# Patient Record
Sex: FEMALE | Race: WHITE | NOT HISPANIC OR LATINO | URBAN - METROPOLITAN AREA
[De-identification: names, ages, dates, MRNs, and addresses within clinical notes are randomized per-mention and may not be internally consistent; named-entity substitution may affect disease eponyms.]

---

## 2020-08-03 ENCOUNTER — TELEPHONE ENCOUNTER (OUTPATIENT)
Dept: URBAN - METROPOLITAN AREA CLINIC 78 | Facility: CLINIC | Age: 85
End: 2020-08-03

## 2020-08-03 ENCOUNTER — LAB OUTSIDE AN ENCOUNTER (OUTPATIENT)
Dept: URBAN - METROPOLITAN AREA CLINIC 78 | Facility: CLINIC | Age: 85
End: 2020-08-03

## 2020-08-20 LAB — H PYLORI BREATH TEST: NEGATIVE

## 2022-07-12 ENCOUNTER — OFFICE VISIT (OUTPATIENT)
Dept: URBAN - METROPOLITAN AREA CLINIC 12 | Facility: CLINIC | Age: 87
End: 2022-07-12
Payer: MEDICARE

## 2022-07-12 ENCOUNTER — WEB ENCOUNTER (OUTPATIENT)
Dept: URBAN - METROPOLITAN AREA CLINIC 12 | Facility: CLINIC | Age: 87
End: 2022-07-12

## 2022-07-12 VITALS
TEMPERATURE: 98 F | HEIGHT: 62 IN | SYSTOLIC BLOOD PRESSURE: 152 MMHG | DIASTOLIC BLOOD PRESSURE: 73 MMHG | WEIGHT: 155 LBS | BODY MASS INDEX: 28.52 KG/M2 | HEART RATE: 67 BPM

## 2022-07-12 DIAGNOSIS — K44.9 HIATAL HERNIA: ICD-10-CM

## 2022-07-12 DIAGNOSIS — E66.3 OVERWEIGHT (BMI 25.0-29.9): ICD-10-CM

## 2022-07-12 DIAGNOSIS — K86.2 PANCREATIC CYST: ICD-10-CM

## 2022-07-12 DIAGNOSIS — K76.9 LIVER LESION: ICD-10-CM

## 2022-07-12 DIAGNOSIS — R12 HEARTBURN: ICD-10-CM

## 2022-07-12 PROBLEM — 162863004: Status: ACTIVE | Noted: 2022-07-12

## 2022-07-12 PROBLEM — 16331000: Status: ACTIVE | Noted: 2022-07-12

## 2022-07-12 PROBLEM — 84089009: Status: ACTIVE | Noted: 2022-07-12

## 2022-07-12 PROCEDURE — 99213 OFFICE O/P EST LOW 20 MIN: CPT | Performed by: INTERNAL MEDICINE

## 2022-07-12 RX ORDER — INSULIN GLARGINE 100 [IU]/ML
INJECTION, SOLUTION SUBCUTANEOUS
Qty: 0 | Refills: 0 | Status: ACTIVE | COMMUNITY
Start: 1900-01-01

## 2022-07-12 RX ORDER — LISINOPRIL 2.5 MG/1
TABLET ORAL
Qty: 0 | Refills: 0 | Status: ACTIVE | COMMUNITY
Start: 1900-01-01

## 2022-07-12 RX ORDER — AMLODIPINE BESYLATE 2.5 MG/1
TABLET ORAL
Qty: 0 | Refills: 0 | Status: ACTIVE | COMMUNITY
Start: 1900-01-01

## 2022-07-12 RX ORDER — ONDANSETRON 4 MG/1
PLACE 1 TABLET ON TOP OF THE TONGUE WHERE IT WILL DISSOLVE, THEN SWALLOW, USE AS NEEDED EVERY 4 HOURS FOR NAUSEA/VOMITING TABLET, ORALLY DISINTEGRATING ORAL
Qty: 40 | Refills: 6 | Status: ACTIVE | COMMUNITY
Start: 2020-03-18

## 2022-07-12 NOTE — HPI-TODAY'S VISIT:
89M whos has been followed more recently Dr. Bosch presents for second opinion.    Had EGD by Dr. Bosch in 12/2019 showing mild reflux esophagitis and med HH noted. Had neg stool studies and neg HP breath test in 2020.   Had CT and then MRI in 2020 with benign appearing small liver lesions. 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  No nausea. No weiht loss. No jaundice. No N/V. No abd pain. No fever or chills. She is otherwise doing well. She sees Dr. Pope.   She exercises regularly. She recently had right wrist fracture which is healing well by her report.  Patient had negative colonoscpy in 2017 except ext hemorrhoids.

## 2022-08-02 ENCOUNTER — LAB OUTSIDE AN ENCOUNTER (OUTPATIENT)
Dept: URBAN - METROPOLITAN AREA CLINIC 12 | Facility: CLINIC | Age: 87
End: 2022-08-02

## 2022-08-03 LAB
CREATININE POC: 0.9
PERFORMING LAB: (no result)

## 2022-08-25 ENCOUNTER — WEB ENCOUNTER (OUTPATIENT)
Dept: URBAN - METROPOLITAN AREA CLINIC 111 | Facility: CLINIC | Age: 87
End: 2022-08-25

## 2022-08-31 ENCOUNTER — CLAIMS CREATED FROM THE CLAIM WINDOW (OUTPATIENT)
Dept: URBAN - METROPOLITAN AREA CLINIC 111 | Facility: CLINIC | Age: 87
End: 2022-08-31
Payer: MEDICARE

## 2022-08-31 VITALS
HEIGHT: 62 IN | TEMPERATURE: 99 F | SYSTOLIC BLOOD PRESSURE: 172 MMHG | WEIGHT: 155 LBS | BODY MASS INDEX: 28.52 KG/M2 | HEART RATE: 73 BPM | DIASTOLIC BLOOD PRESSURE: 49 MMHG

## 2022-08-31 DIAGNOSIS — K76.9 LIVER LESION: ICD-10-CM

## 2022-08-31 DIAGNOSIS — K86.2 PANCREATIC CYST: ICD-10-CM

## 2022-08-31 PROBLEM — 31258000: Status: ACTIVE | Noted: 2022-07-12

## 2022-08-31 PROBLEM — 300331000: Status: ACTIVE | Noted: 2022-07-12

## 2022-08-31 PROCEDURE — 99213 OFFICE O/P EST LOW 20 MIN: CPT | Performed by: NURSE PRACTITIONER

## 2022-08-31 RX ORDER — ONDANSETRON 4 MG/1
PLACE 1 TABLET ON TOP OF THE TONGUE WHERE IT WILL DISSOLVE, THEN SWALLOW, USE AS NEEDED EVERY 4 HOURS FOR NAUSEA/VOMITING TABLET, ORALLY DISINTEGRATING ORAL
Qty: 40 | Refills: 6 | Status: ACTIVE | COMMUNITY
Start: 2020-03-18

## 2022-08-31 RX ORDER — INSULIN GLARGINE 100 [IU]/ML
INJECTION, SOLUTION SUBCUTANEOUS
Qty: 0 | Refills: 0 | Status: ACTIVE | COMMUNITY
Start: 1900-01-01

## 2022-08-31 RX ORDER — AMLODIPINE BESYLATE 2.5 MG/1
TABLET ORAL
Qty: 0 | Refills: 0 | Status: ACTIVE | COMMUNITY
Start: 1900-01-01

## 2022-08-31 RX ORDER — LISINOPRIL 2.5 MG/1
TABLET ORAL
Qty: 0 | Refills: 0 | Status: ACTIVE | COMMUNITY
Start: 1900-01-01

## 2022-08-31 NOTE — HPI-TODAY'S VISIT:
89 female presents for following up for a recent MRI result. 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 FU MRI recommended in 2 years. She has hx of benign appearing small liver lesions. 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 Denies any gi symptoms currently. She exercises regularly. She recently had right wrist fracture which is healing well, currently in PT. She is doing well otherwise, sees Dr. Pope. Patient had negative colonoscopy in 2017 except ext hemorrhoids.

## 2023-08-15 ENCOUNTER — OFFICE VISIT (OUTPATIENT)
Dept: URBAN - METROPOLITAN AREA CLINIC 12 | Facility: CLINIC | Age: 88
End: 2023-08-15

## 2023-08-21 ENCOUNTER — OFFICE VISIT (OUTPATIENT)
Dept: URBAN - METROPOLITAN AREA CLINIC 111 | Facility: CLINIC | Age: 88
End: 2023-08-21
Payer: MEDICARE

## 2023-08-21 ENCOUNTER — DASHBOARD ENCOUNTERS (OUTPATIENT)
Age: 88
End: 2023-08-21

## 2023-08-21 VITALS
HEIGHT: 62 IN | TEMPERATURE: 97.9 F | SYSTOLIC BLOOD PRESSURE: 110 MMHG | DIASTOLIC BLOOD PRESSURE: 56 MMHG | HEART RATE: 93 BPM | WEIGHT: 157 LBS | BODY MASS INDEX: 28.89 KG/M2

## 2023-08-21 DIAGNOSIS — K86.2 PANCREATIC CYST: ICD-10-CM

## 2023-08-21 DIAGNOSIS — K76.9 LIVER LESION: ICD-10-CM

## 2023-08-21 DIAGNOSIS — R19.7 DIARRHEA, UNSPECIFIED TYPE: ICD-10-CM

## 2023-08-21 PROCEDURE — 99213 OFFICE O/P EST LOW 20 MIN: CPT | Performed by: NURSE PRACTITIONER

## 2023-08-21 RX ORDER — ONDANSETRON 4 MG/1
PLACE 1 TABLET ON TOP OF THE TONGUE WHERE IT WILL DISSOLVE, THEN SWALLOW, USE AS NEEDED EVERY 4 HOURS FOR NAUSEA/VOMITING TABLET, ORALLY DISINTEGRATING ORAL
Qty: 40 | Refills: 6 | Status: ACTIVE | COMMUNITY
Start: 2020-03-18

## 2023-08-21 RX ORDER — AMLODIPINE BESYLATE 2.5 MG/1
TABLET ORAL
Qty: 0 | Refills: 0 | Status: ACTIVE | COMMUNITY
Start: 1900-01-01

## 2023-08-21 RX ORDER — INSULIN GLARGINE 100 [IU]/ML
INJECTION, SOLUTION SUBCUTANEOUS
Qty: 0 | Refills: 0 | Status: ACTIVE | COMMUNITY
Start: 1900-01-01

## 2023-08-21 RX ORDER — LISINOPRIL 2.5 MG/1
TABLET ORAL
Qty: 0 | Refills: 0 | Status: ACTIVE | COMMUNITY
Start: 1900-01-01

## 2023-08-21 NOTE — HPI-TODAY'S VISIT:
89 yo female presents for diarrhea. Reports intermittent diarrhea for months. Has diarrhea at least once a week, take Imodium prn with relief. Stopped metoprolol with some relief. Denies fever, chills, abd pain, n/v or blood in stool. No weight loss. Denies recent abx use, take advil prn for joint pain. Last colonoscopy in 2017 negative. Has seen Dr. 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 FU MRI recommended in 2 years. She has hx of benign appearing small liver lesions. 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

## 2023-08-24 ENCOUNTER — LAB OUTSIDE AN ENCOUNTER (OUTPATIENT)
Dept: URBAN - METROPOLITAN AREA CLINIC 111 | Facility: CLINIC | Age: 88
End: 2023-08-24

## 2023-08-29 LAB
ADENOVIRUS F 40/41: NOT DETECTED
CALPROTECTIN, STOOL - QDX: (no result)
CAMPYLOBACTER: NOT DETECTED
CLOSTRIDIUM DIFFICILE: NOT DETECTED
ENTAMOEBA HISTOLYTICA: NOT DETECTED
ENTEROAGGREGATIVE E.COLI: NOT DETECTED
ENTEROTOXIGENIC E.COLI: NOT DETECTED
ESCHERICHIA COLI O157: NOT DETECTED
GIARDIA LAMBLIA: NOT DETECTED
NOROVIRUS GI/GII: NOT DETECTED
ROTAVIRUS A: NOT DETECTED
SALMONELLA SPP.: NOT DETECTED
SHIGA-LIKE TOXIN PRODUCING E.COLI: NOT DETECTED
SHIGELLA SPP. / ENTEROINVASIVE E.COLI: NOT DETECTED
VIBRIO PARAHAEMOLYTICUS: NOT DETECTED
VIBRIO SPP.: NOT DETECTED
YERSINIA ENTEROCOLITICA: NOT DETECTED

## 2024-08-05 ENCOUNTER — OFFICE VISIT (OUTPATIENT)
Dept: URBAN - METROPOLITAN AREA CLINIC 12 | Facility: CLINIC | Age: 89
End: 2024-08-05
Payer: MEDICARE

## 2024-08-05 ENCOUNTER — LAB OUTSIDE AN ENCOUNTER (OUTPATIENT)
Dept: URBAN - METROPOLITAN AREA CLINIC 12 | Facility: CLINIC | Age: 89
End: 2024-08-05

## 2024-08-05 VITALS
SYSTOLIC BLOOD PRESSURE: 155 MMHG | HEIGHT: 62 IN | WEIGHT: 155 LBS | HEART RATE: 86 BPM | DIASTOLIC BLOOD PRESSURE: 67 MMHG | BODY MASS INDEX: 28.52 KG/M2 | TEMPERATURE: 97.7 F

## 2024-08-05 DIAGNOSIS — E66.3 OVERWEIGHT (BMI 25.0-29.9): ICD-10-CM

## 2024-08-05 DIAGNOSIS — R19.5 LOOSE STOOLS: ICD-10-CM

## 2024-08-05 DIAGNOSIS — R19.4 CHANGE IN BOWEL HABITS: ICD-10-CM

## 2024-08-05 DIAGNOSIS — K86.2 PANCREATIC CYST: ICD-10-CM

## 2024-08-05 PROBLEM — 88111009: Status: ACTIVE | Noted: 2024-08-05

## 2024-08-05 PROBLEM — 398032003: Status: ACTIVE | Noted: 2024-08-05

## 2024-08-05 PROCEDURE — 99214 OFFICE O/P EST MOD 30 MIN: CPT | Performed by: INTERNAL MEDICINE

## 2024-08-05 RX ORDER — AMLODIPINE BESYLATE 2.5 MG/1
TABLET ORAL
Qty: 0 | Refills: 0 | Status: ACTIVE | COMMUNITY
Start: 1900-01-01

## 2024-08-05 RX ORDER — LISINOPRIL 2.5 MG/1
TABLET ORAL
Qty: 0 | Refills: 0 | Status: ACTIVE | COMMUNITY
Start: 1900-01-01

## 2024-08-05 RX ORDER — INSULIN GLARGINE 100 [IU]/ML
INJECTION, SOLUTION SUBCUTANEOUS
Qty: 0 | Refills: 0 | Status: ACTIVE | COMMUNITY
Start: 1900-01-01

## 2024-08-05 RX ORDER — ONDANSETRON 4 MG/1
PLACE 1 TABLET ON TOP OF THE TONGUE WHERE IT WILL DISSOLVE, THEN SWALLOW, USE AS NEEDED EVERY 4 HOURS FOR NAUSEA/VOMITING TABLET, ORALLY DISINTEGRATING ORAL
Qty: 40 | Refills: 6 | Status: ON HOLD | COMMUNITY
Start: 2020-03-18

## 2024-08-05 NOTE — HPI-TODAY'S VISIT:
89F presents for eval.  She saw Leonel Nash NP in August 2023.   She has had diarrhea with negative stool testing including low calprotectin at 27. No bleeding. No N/V. No appetite problems. No signficant abd pain at present. Did have some vague aches and pains earlier. She wants to lose weight and trying to eat better. Was 157# last year and today is at 155#. She lives with [facility]. Patient does her own cooking and all ADLs w/o problems and also drives. She has been taking Metamucil which has helped with daily intake for the past month or so. Had small pancreatic cyst at 12mm in neck in 2022. No jaundice. No fever or chills. No chest pain or shortness of breath. Had negative colon in 2017 and negative EGD in 2019. Is This A New Presentation, Or A Follow-Up?: Skin Lesion What Type Of Note Output Would You Prefer (Optional)?: Standard Output How Severe Is Your Skin Lesion?: mild Has Your Skin Lesion Been Treated?: not been treated